# Patient Record
Sex: MALE | Race: WHITE | Employment: UNEMPLOYED
[De-identification: names, ages, dates, MRNs, and addresses within clinical notes are randomized per-mention and may not be internally consistent; named-entity substitution may affect disease eponyms.]

---

## 2017-02-13 ENCOUNTER — OFFICE VISIT (OUTPATIENT)
Dept: FAMILY MEDICINE CLINIC | Facility: CLINIC | Age: 32
End: 2017-02-13

## 2017-02-13 VITALS
WEIGHT: 188.6 LBS | SYSTOLIC BLOOD PRESSURE: 124 MMHG | OXYGEN SATURATION: 98 % | TEMPERATURE: 98 F | HEIGHT: 73 IN | HEART RATE: 85 BPM | DIASTOLIC BLOOD PRESSURE: 80 MMHG | BODY MASS INDEX: 25 KG/M2

## 2017-02-13 DIAGNOSIS — F32.A ANXIETY AND DEPRESSION: ICD-10-CM

## 2017-02-13 DIAGNOSIS — M54.9 BACK PAIN, UNSPECIFIED BACK LOCATION, UNSPECIFIED BACK PAIN LATERALITY, UNSPECIFIED CHRONICITY: ICD-10-CM

## 2017-02-13 DIAGNOSIS — Z83.3 FAMILY HISTORY OF DIABETES MELLITUS: ICD-10-CM

## 2017-02-13 DIAGNOSIS — F19.10 POLYSUBSTANCE ABUSE (HCC): ICD-10-CM

## 2017-02-13 DIAGNOSIS — F41.9 ANXIETY AND DEPRESSION: ICD-10-CM

## 2017-02-13 DIAGNOSIS — G47.00 INSOMNIA, UNSPECIFIED TYPE: Primary | ICD-10-CM

## 2017-02-13 PROCEDURE — 99214 OFFICE O/P EST MOD 30 MIN: CPT | Performed by: PHYSICIAN ASSISTANT

## 2017-02-13 RX ORDER — MELOXICAM 15 MG/1
15 TABLET ORAL DAILY
Qty: 30 TABLET | Refills: 0 | Status: SHIPPED | OUTPATIENT
Start: 2017-02-13 | End: 2017-04-20 | Stop reason: SDUPTHER

## 2017-02-13 RX ORDER — HYDROXYZINE PAMOATE 25 MG/1
25 CAPSULE ORAL 3 TIMES DAILY PRN
Qty: 30 CAPSULE | Refills: 0 | Status: SHIPPED | OUTPATIENT
Start: 2017-02-13 | End: 2017-04-20 | Stop reason: SDUPTHER

## 2017-02-13 ASSESSMENT — ENCOUNTER SYMPTOMS
NAUSEA: 0
SHORTNESS OF BREATH: 0
BOWEL INCONTINENCE: 0
SINUS PRESSURE: 0
EYE DISCHARGE: 0
COUGH: 0
SORE THROAT: 0
DIARRHEA: 0
RHINORRHEA: 0
BACK PAIN: 1
EYE ITCHING: 0
CHEST TIGHTNESS: 0
VOMITING: 0
ABDOMINAL PAIN: 0

## 2017-02-13 ASSESSMENT — PATIENT HEALTH QUESTIONNAIRE - PHQ9
SUM OF ALL RESPONSES TO PHQ QUESTIONS 1-9: 0
2. FEELING DOWN, DEPRESSED OR HOPELESS: 0
SUM OF ALL RESPONSES TO PHQ9 QUESTIONS 1 & 2: 0
1. LITTLE INTEREST OR PLEASURE IN DOING THINGS: 0

## 2017-02-21 ENCOUNTER — TELEPHONE (OUTPATIENT)
Dept: FAMILY MEDICINE CLINIC | Facility: CLINIC | Age: 32
End: 2017-02-21

## 2017-03-02 ENCOUNTER — INITIAL CONSULT (OUTPATIENT)
Dept: PAIN MANAGEMENT | Facility: CLINIC | Age: 32
End: 2017-03-02

## 2017-03-02 VITALS
RESPIRATION RATE: 16 BRPM | HEART RATE: 84 BPM | DIASTOLIC BLOOD PRESSURE: 81 MMHG | SYSTOLIC BLOOD PRESSURE: 123 MMHG | WEIGHT: 192.8 LBS | OXYGEN SATURATION: 98 % | BODY MASS INDEX: 25.55 KG/M2 | HEIGHT: 73 IN

## 2017-03-02 DIAGNOSIS — G89.29 CHRONIC BILATERAL LOW BACK PAIN WITHOUT SCIATICA: Primary | ICD-10-CM

## 2017-03-02 DIAGNOSIS — M54.50 CHRONIC BILATERAL LOW BACK PAIN WITHOUT SCIATICA: Primary | ICD-10-CM

## 2017-03-02 PROBLEM — M54.42 CHRONIC BILATERAL LOW BACK PAIN WITH LEFT-SIDED SCIATICA: Status: ACTIVE | Noted: 2017-03-02

## 2017-03-02 PROCEDURE — 99243 OFF/OP CNSLTJ NEW/EST LOW 30: CPT | Performed by: ANESTHESIOLOGY

## 2017-03-02 RX ORDER — GABAPENTIN 300 MG/1
300 CAPSULE ORAL 2 TIMES DAILY
Qty: 60 CAPSULE | Refills: 3 | Status: SHIPPED | OUTPATIENT
Start: 2017-03-02 | End: 2017-04-01

## 2017-03-02 RX ORDER — BACLOFEN 10 MG/1
10 TABLET ORAL 2 TIMES DAILY
Qty: 60 TABLET | Refills: 3 | Status: SHIPPED | OUTPATIENT
Start: 2017-03-02 | End: 2017-04-01

## 2017-03-02 RX ORDER — BUSPIRONE HYDROCHLORIDE 10 MG/1
10 TABLET ORAL 4 TIMES DAILY
COMMUNITY

## 2017-03-02 ASSESSMENT — ENCOUNTER SYMPTOMS
BACK PAIN: 1
ALLERGIC/IMMUNOLOGIC NEGATIVE: 1
RESPIRATORY NEGATIVE: 1
GASTROINTESTINAL NEGATIVE: 1
SINUS PRESSURE: 1
EYES NEGATIVE: 1

## 2017-04-21 RX ORDER — MELOXICAM 15 MG/1
TABLET ORAL
Qty: 30 TABLET | Refills: 0 | Status: SHIPPED | OUTPATIENT
Start: 2017-04-21

## 2017-04-21 RX ORDER — HYDROXYZINE PAMOATE 25 MG/1
CAPSULE ORAL
Qty: 30 CAPSULE | Refills: 0 | Status: SHIPPED | OUTPATIENT
Start: 2017-04-21

## 2022-08-04 ENCOUNTER — HOSPITAL ENCOUNTER (EMERGENCY)
Age: 37
Discharge: HOME | End: 2022-08-04
Attending: EMERGENCY MEDICINE
Payer: MEDICARE

## 2022-08-04 ENCOUNTER — APPOINTMENT (OUTPATIENT)
Dept: GENERAL RADIOLOGY | Age: 37
End: 2022-08-04
Payer: MEDICARE

## 2022-08-04 VITALS
RESPIRATION RATE: 17 BRPM | WEIGHT: 178 LBS | OXYGEN SATURATION: 96 % | HEIGHT: 73 IN | HEART RATE: 79 BPM | SYSTOLIC BLOOD PRESSURE: 111 MMHG | DIASTOLIC BLOOD PRESSURE: 62 MMHG | TEMPERATURE: 98.5 F | BODY MASS INDEX: 23.59 KG/M2

## 2022-08-04 DIAGNOSIS — R07.89 ATYPICAL CHEST PAIN: Primary | ICD-10-CM

## 2022-08-04 LAB
ABSOLUTE EOS #: 0.3 K/UL (ref 0–0.4)
ABSOLUTE LYMPH #: 2.8 K/UL (ref 1–4.8)
ABSOLUTE MONO #: 1 K/UL (ref 0.1–1.2)
ANION GAP SERPL CALCULATED.3IONS-SCNC: 9 MMOL/L (ref 9–17)
BASOPHILS # BLD: 1 % (ref 0–2)
BASOPHILS ABSOLUTE: 0.1 K/UL (ref 0–0.2)
BUN BLDV-MCNC: 13 MG/DL (ref 6–20)
CALCIUM SERPL-MCNC: 9.7 MG/DL (ref 8.6–10.4)
CHLORIDE BLD-SCNC: 100 MMOL/L (ref 98–107)
CO2: 30 MMOL/L (ref 20–31)
CREAT SERPL-MCNC: 0.74 MG/DL (ref 0.7–1.2)
EOSINOPHILS RELATIVE PERCENT: 3 % (ref 1–4)
GFR AFRICAN AMERICAN: >60 ML/MIN
GFR NON-AFRICAN AMERICAN: >60 ML/MIN
GFR SERPL CREATININE-BSD FRML MDRD: NORMAL ML/MIN/{1.73_M2}
GLUCOSE BLD-MCNC: 92 MG/DL (ref 70–99)
HCT VFR BLD CALC: 42.6 % (ref 41–53)
HEMOGLOBIN: 14.7 G/DL (ref 13.5–17.5)
LYMPHOCYTES # BLD: 25 % (ref 24–44)
MCH RBC QN AUTO: 30.9 PG (ref 26–34)
MCHC RBC AUTO-ENTMCNC: 34.6 G/DL (ref 31–37)
MCV RBC AUTO: 89.3 FL (ref 80–100)
MONOCYTES # BLD: 9 % (ref 2–11)
PDW BLD-RTO: 13.9 % (ref 12.5–15.4)
PLATELET # BLD: 230 K/UL (ref 140–450)
PMV BLD AUTO: 8.8 FL (ref 6–12)
POTASSIUM SERPL-SCNC: 4.1 MMOL/L (ref 3.7–5.3)
RBC # BLD: 4.76 M/UL (ref 4.5–5.9)
SARS-COV-2, RAPID: NOT DETECTED
SEG NEUTROPHILS: 62 % (ref 36–66)
SEGMENTED NEUTROPHILS ABSOLUTE COUNT: 7.1 K/UL (ref 1.8–7.7)
SODIUM BLD-SCNC: 139 MMOL/L (ref 135–144)
SPECIMEN DESCRIPTION: NORMAL
TROPONIN, HIGH SENSITIVITY: 10 NG/L (ref 0–22)
WBC # BLD: 11.5 K/UL (ref 3.5–11)

## 2022-08-04 PROCEDURE — 80048 BASIC METABOLIC PNL TOTAL CA: CPT

## 2022-08-04 PROCEDURE — 84484 ASSAY OF TROPONIN QUANT: CPT

## 2022-08-04 PROCEDURE — 99285 EMERGENCY DEPT VISIT HI MDM: CPT

## 2022-08-04 PROCEDURE — 87635 SARS-COV-2 COVID-19 AMP PRB: CPT

## 2022-08-04 PROCEDURE — 93005 ELECTROCARDIOGRAM TRACING: CPT | Performed by: PHYSICIAN ASSISTANT

## 2022-08-04 PROCEDURE — 6370000000 HC RX 637 (ALT 250 FOR IP): Performed by: PHYSICIAN ASSISTANT

## 2022-08-04 PROCEDURE — 85025 COMPLETE CBC W/AUTO DIFF WBC: CPT

## 2022-08-04 PROCEDURE — 71045 X-RAY EXAM CHEST 1 VIEW: CPT

## 2022-08-04 RX ORDER — ASPIRIN 81 MG/1
324 TABLET, CHEWABLE ORAL ONCE
Status: COMPLETED | OUTPATIENT
Start: 2022-08-04 | End: 2022-08-04

## 2022-08-04 RX ADMIN — ASPIRIN 81 MG CHEWABLE TABLET 324 MG: 81 TABLET CHEWABLE at 12:56

## 2022-08-04 ASSESSMENT — PAIN DESCRIPTION - LOCATION: LOCATION: CHEST

## 2022-08-04 ASSESSMENT — PAIN DESCRIPTION - ONSET: ONSET: SUDDEN

## 2022-08-04 ASSESSMENT — PAIN DESCRIPTION - DESCRIPTORS: DESCRIPTORS: PRESSURE;POUNDING

## 2022-08-04 ASSESSMENT — PAIN SCALES - GENERAL: PAINLEVEL_OUTOF10: 7

## 2022-08-04 ASSESSMENT — PAIN - FUNCTIONAL ASSESSMENT
PAIN_FUNCTIONAL_ASSESSMENT: ACTIVITIES ARE NOT PREVENTED
PAIN_FUNCTIONAL_ASSESSMENT: 0-10

## 2022-08-04 ASSESSMENT — PAIN DESCRIPTION - ORIENTATION: ORIENTATION: MID;RIGHT

## 2022-08-04 ASSESSMENT — PAIN DESCRIPTION - PAIN TYPE: TYPE: ACUTE PAIN

## 2022-08-04 ASSESSMENT — PAIN DESCRIPTION - FREQUENCY: FREQUENCY: CONTINUOUS

## 2022-08-04 NOTE — ED PROVIDER NOTES
[id number] [facility] ED  [address] [facility] [address]  Phone: [phone]  Fax: [phone]        Pt Name: Braden Puckett  MRN: [id number]  Radhagfdon 1985  Date of evaluation: 8/4/22    [address] [place] [address]       Chief Complaint   Patient presents with    Chest Pain     Patient's chest pain started last night after playing with his kids. Patient feels like he has been punched in his chest.  Patient has increased pain with inspiration. HISTORY OF PRESENT ILLNESS (Location/Symptom, Timing/Onset, Context/Setting, Quality, Duration, Modifying Factors, Severity)      Braden Puckett is a 39 y.o. male who presents to the ED via private auto with chest pain. Patient reports that last night he began experiencing a \"tenseness\" in his midsternal chest off to the right side and notes that he was wrestling with his kids he thought maybe he pulled something. He reports that this pain has persisted today as well as some intermittent sharp pains to the lateral aspect of his right chest.  Does feel a little short of breath and that he feels like he cannot take a deep breath and it is painful to do so. Denies history of blood clots, clotting disorders, or malignancy. Denies recent trauma, surgery, or extended travel. Denies hemoptysis, calf pain, or leg swelling. No use of hormones. Patient does have a history of of daily inhaled crack use in the past but has been sober for 2 months and is currently in rehab around many other people. He does report of nasal congestion and cough that started 2 to 3 days ago. Also had some ear pain but this resolved. He has not vaccinated for COVID. Denies taking any medication for symptoms. Denies noticing any other exacerbating or alleviating factors. Denies any exertional changes to the pain. Does mention some occasional intermittent lightheadedness, but nothing today. No syncope.   Cardiac history in grandparents, but no deaths at young ages due to cardiac cause.  Denies any fever, chills, myalgias, sore throat, flank pain, urinary symptoms, nausea, vomiting, diarrhea, or any other concerns at this time. To add, patient went to an urgent care prior to arrival and there was an abnormality on the EKG so they sent him to the ER for further evaluation. PAST MEDICAL / SURGICAL / SOCIAL / FAMILY HISTORY     PMH:  has a past medical history of Cluster headaches and Substance abuse ([address] Surgical History:  has a past surgical history that includes Hand surgery. Social History:  reports that he has been smoking cigarettes. He has been smoking an average of 1 pack per day. He has never used smokeless tobacco. He reports that he does not currently use drugs after having used the following drugs: Methamphetamines (Crystal Meth). He reports that he does not drink alcohol. Family History: He indicated that his mother is alive. He indicated that his father is alive. family history includes Diabetes in his father; High Cholesterol in his father; Other in his father and mother. Psychiatric History: None    Allergies: Darvocet a500 [propoxyphene n-acetaminophen]    Home Medications:   Prior to Admission medications    Medication Sig Start Date End Date Taking? Authorizing Provider   hydrOXYzine (VISTARIL) 25 MG capsule take 1 capsule by mouth three times a day if needed for itching  Patient not taking: Reported on 8/4/2022 4/21/17   Jasper Leach PA-C   meloxicam (MOBIC) 15 MG tablet take 1 take by mouth once daily  Patient not taking: Reported on 8/4/2022 4/21/17   Karin Cosme PA-C   busPIRone (BUSPAR) 10 MG tablet Take 10 mg by mouth 4 times daily  Patient not taking: Reported on 8/4/2022    Historical Provider, MD   gabapentin (NEURONTIN) 300 MG capsule Take 1 capsule by mouth 2 times daily 3/2/17 4/1/17  Alina Gutierrez MD       REVIEW OF SYSTEMS  (2-9 systems for level 4, 10 ormore for level 5)      Review of Systems    Constitutional: See HPI.    Eyes: Denies vision changes. HENT: Denies sore throat or neck pain. Respiratory: See HPI. Cardiovascular: See HPI. GI: Denies vomiting or diarrhea. : Denies painful urination. Musculoskeletal: Denies recent trauma. Skin: Denies new rashes or wounds. Neurologic:  Denies new numbness or weakness. Psychiatric: Denies anxiety. All other systems negative except as marked. PHYSICAL EXAM  (up to 7 for level 4, 8 or more for level 5)      INITIAL VITALS:  height is 6' 1\" (1.854 m) and weight is 80.7 kg (178 lb). His oral temperature is 98.5 °F (36.9 °C). His blood pressure is 111/62 and his pulse is 79. His respiration is 17 and oxygen saturation is 96%. Vital signs reviewed. Physical Exam    General:  Alert, cooperative, well-groomed, well-nourished, appears stated age, and is in no acute distress. Head:  Normocephalic, atraumatic, and without obvious abnormality. Eyes:  Sclerae/conjunctivae clear without injection, pallor, or icterus. Corneas clear without opacities. EOM's intact. ENT: Ears and nose are all without obvious masses lesion or deformity. [place] are in proper alignment without lesions, deformities, masses, erythema, or tenderness. No tragal tenderness. Canals are clear bilaterally without swelling, erythema, or discharge, with a small amount of wet cerumen bilaterally. The bilateral TM's are intact, clear, and translucent without scarring. The light reflex and bony landmarks are present without erythema, bulging or retraction. Hearing grossly intact. Mild swollen erythematous turbinates. Lips and buccal mucosa are pink and moist without lesions. Good dentition. Gingivae is pink and without lesions. Tongue and uvula midline. Symmetric elevation of soft palate upon phonation. No hoarseness or muffled voice. Oropharynx is clear, without erythema. Tonsils are symmetrical, without enlargement or erythema, bilaterally. No exudates or drainage. Neck: Supple and symmetrical. Trachea midline. No adenopathy. No jugular venous distention. Lungs:   No respiratory distress. CTA bilaterally. No wheezes, rhonchi, or rales. Mild right-sided chest wall tenderness to palpation. Heart:  Regular rate. Regular rhythm. No murmurs, rubs, or gallops. Abdomen:   Normoactive bowel sounds. Soft, nontender, nondistended without guarding or rebound. No palpable masses. No CVA tenderness. Extremities: Warm and dry without erythema or edema. No venous stasis changes. Distal pulses 2+. Skin: Soft, good turgor, and well-hydrated. No obvious rashes or lesions. Neurologic: GCS is 15 and no focal deficits are appreciated. Normal gait. Grossly normal motor and sensation. Speech clear. Psychiatric: Normal mood and affect. Normal behavior. Coherent thought process. DIFFERENTIAL DIAGNOSIS / MDM     Patient is a 39 y.o. male who presents to the ED today with the complaints as described above. Vital signs are unremarkable and physical exam demonstrates a well-appearing nontoxic male in no acute distress. Lungs are clear to auscultation. Heart rate and rhythm are regular. Abdomen is soft and nontender. No peripheral edema. Pulses are 2+ and symmetrical.  He is PERC negative. Plan to obtain EKG, CXR, and labwork and reassess.     The patient's CAD and PE risk factors and PERC criteria are as follows:    Risk Stratification for Acute Coronary Syndrome   Family history of coronary artery disease - Yes  History of diabetes - No  History of hypertension - No  History of hyperlipidemia  - No  History of smoking - Yes  Cocaine use - Yes  Known coronary artery disease - No    Risk Stratification for Thoracic Aortic Dissection (TAD)  Family history of TAD - No  History of connective tissue disorder (i.e. Marfans Syndrome, Manfred-Danlos Syndrome) - No  Aguilars Syndrome - No  History of hypertension - No  History of aortic valve disease - No  Pregnancy - No    HEART Risk Score:      Criteria Score Patient #   History High Suspicion 2 0    Mod Suspicion 1     Slightly Suspicion 0    EKG Significant ST Dep. 2 1    Nonspecific 1     Normal 0    Age ? 71 y/o 2 39 y.o.  0    > 40 y/o & < 71 y/o 1     ? 40 y/o 0    Risk Factors* ? 3 2 2    1-2 1     0 0    Troponin ? 3x NL 2 0    > 1 & < 3x NL 1     Normal 0    Score 0-3 Low 3    4-6 Mod     ?7 High    * Risk Factors include: Diabetes, Tobacco use, Hypertension, Hyperlipidemia, Obesity, Atherosclerotic Disease (Prior MI, PCI/CABG, CVA/TIA, PAD), Family History of CAD (before age 72)     [facility]  Criteria Score Response Patient #   Clinical Signs & Sxs of DVT 3 No 0   PE is Likely 1° Diagnosis 3 No 0   Heart Rate ? 100 1.5 No 0   Immobilization ? 3 days, or surgery ? 4 weeks 1.5 No 0   Previous objective dx of PE/DVT 1.5 No 0   Hemoptysis 1 No 0   Malignancy + treatment in past 6 months or palliative 1 No 0   Low Risk 0-1 1.3% chance of PE 0   Moderate Risk 1-6 16.2% chance of PE    High Risk >7 37.5% chance of PE      PERC Criteria   Criteria Response   Age ? 50 No   Heart Rate ? 100 No   Pulse Oximetry ? 95 No   Previous history of DVT/PE No   Trauma/surgery ? 4 weeks No   Hemoptysis No   Exogenous estrogen use No   Unilateral leg swelling No   * Pulmonary embolism may be ruled out with a pretest probability of <15% and completely negative PERC Criteria. The likelihood of pulmonary embolism in this population is 1.8%.     PLAN (LABS / IMAGING / EKG):  Orders Placed This Encounter   Procedures    COVID-19, Rapid    XR CHEST PORTABLE    CBC with Auto Differential    Basic Metabolic Panel w/ Reflex to MG    Troponin    EKG 12 Lead       MEDICATIONS ORDERED:  Orders Placed This Encounter   Medications    aspirin chewable tablet 324 mg       Controlled Substances Monitoring:     DIAGNOSTIC RESULTS     EKG: All EKG's are interpreted by the Emergency Department Physician who either signs or Co-signs this chart in the [address] a cardiologist.    EKG mmol/L    CO2 30 20 - 31 mmol/L    Anion Gap 9 9 - 17 mmol/L    GFR Non-African American >60 >60 mL/min    GFR African American >60 >60 mL/min    GFR Comment         Troponin   Result Value Ref Range    Troponin, High Sensitivity 10 0 - 22 ng/L       EMERGENCY DEPARTMENT COURSE     ED Course as of 08/04/22 1658   Thu Aug 04, 2022   1242 CBC demonstrates minimal bump in WBC at 11.5 but otherwise unremarkable. [MG]   1314 BMP is unremarkable. Troponin is within normal limits. COVID is negative. Chest x-ray is unremarkable. [MG]      ED Course User Index  [MG] Ana María Wagoner PA-C        Vitals:    Vitals:    08/04/22 1222 08/04/22 1225 08/04/22 1250   BP: 129/84  111/62   Pulse: 84 84 79   Resp: 14  17   Temp: 98.5 °F (36.9 °C)     TempSrc: Oral     SpO2: 97%  96%   Weight: 80.7 kg (178 lb)     Height: 6' 1\" (1.854 m)       -------------------------  BP: 111/62, Temp: 98.5 °F (36.9 °C), Heart Rate: 79, Resp: 17      RE-EVALUATION:  See ED Course notes above. The patient's symptomatology and physical exam are not completely consistent with esophageal rupture, myocarditis, ACS, PE, pneumothorax, cardiac tomponade, or aortic pathology. Patient is PERC negative. The patient's EKG demonstrates normal sinus without acute ischemia/infarction noted. The patient's chest x-ray was within normal limits making significant pneumothorax, pneumonia, lung abscess, pleurisy, or aortic pathology much less likely. Patient's lab work including troponin was unremarkable. Patient has been updated regarding these results and was sleeping on arrival. Possibly msk. Would like a note to rest for tomorrow. The patient and/or family and I have discussed the diagnosis and risks, and we agree with discharging home to follow-up with their primary doctor.  The patient appears stable for discharge and has been instructed to return immediately for new concerning symptoms, if the symptoms worsen in any way, or in 8-12 hours if not improved for re-evaluation. We have discussed the symptoms which are most concerning (e.g., fever, changing or worsening pain, persistent vomiting, bloody stools, chest pain, shortness of breath, numbness or weakness to the arms or legs, coolness or color change to the arms or legs) that necessitate immediate return. The patient understands that at this time there is no evidence for a more malignant underlying process, but the patient also understands that early in the process of an illness or injury, an emergency department workup can be falsely reassuring. Routine discharge counseling was given, and the patient understands that worsening, changing or persistent symptoms should prompt an immediate call or follow up with their primary physician or return to the emergency department. The importance of appropriate follow up was also discussed. I have reviewed the disposition diagnosis with the patient and or their family/guardian. I have answered their questions and given discharge instructions. They voiced understanding of these instructions and did not have any further questions or complaints. This patient was seen by the attending physician and they agreed with the assessment and plan. CONSULTS:  None    PROCEDURES:  None    FINAL IMPRESSION      1. Atypical chest pain          DISPOSITION / PLAN     CONDITION ON DISPOSITION:   Good / Stable for discharge.      PATIENT REFERRED TO:  Kodi Rausch PA-C  [address] [place] [address]  [phone]    Call in 1 day      DISCHARGE MEDICATIONS:  Discharge Medication List as of 8/4/2022  1:34 PM          Mae Hargrove   Emergency Medicine Physician Assistant    (Please note that portions of this note were completed with a voice recognition program.  Efforts were made to edit the dictations but occasionally words aremis-transcribed.)        aBrrera Terry PA-C  08/04/22 [id number]

## 2022-08-04 NOTE — ED PROVIDER NOTES
Emergency Department           COMPLAINT       Chief Complaint   Patient presents with    Chest Pain     Patient's chest pain started last night after playing with his kids. Patient feels like he has been punched in his chest.  Patient has increased pain with inspiration. PHYSICAL EXAM      ED Triage Vitals [08/04/22 1222]   BP Temp Temp Source Heart Rate Resp SpO2 Height Weight   129/84 98.5 °F (36.9 °C) Oral 84 14 97 % 6' 1\" (1.854 m) 178 lb (80.7 kg)       Constitutional: Alert, oriented x3, nontoxic, answering questions appropriately, acting properly for age, in no acute distress   HEENT: Extraocular muscles intact,   Neck: Trachea midline   Cardiovascular: Regular rhythm and rate no murmurs   Respiratory: Clear to auscultation bilaterally no wheezes, rhonchi, rales, no respiratory distress no tachypnea no retractions no hypoxia  Gastrointestinal: Soft, nontender, nondistended, positive bowel sounds. No rebound, rigidity, or guarding. Musculoskeletal: No extremity pain or swelling no calf tenderness or asymmetry  Neurologic: Moving all 4 extremities without difficulty there are no gross focal neurologic deficits   Skin: Warm and dry     Physical Exam  DIAGNOSTIC RESULTS     EKG: All EKG's are interpreted by the Emergency Department Physician who either signs or Co-signs this chart in the absence of a cardiologist.    1226 sinus rhythm rate 83  QRs 98  no acute ST or T wave changes. Not indicated unless otherwise documented above or in the midlevel documentation    LABS:  Results for orders placed or performed during the hospital encounter of 08/04/22   COVID-19, Rapid    Specimen: Nasopharyngeal Swab   Result Value Ref Range    Specimen Description . NASOPHARYNGEAL SWAB     SARS-CoV-2, Rapid Not Detected Not Detected   CBC with Auto Differential   Result Value Ref Range    WBC 11.5 (H) 3.5 - 11.0 k/uL    RBC 4.76 4.5 - 5.9 m/uL    Hemoglobin 14.7 13.5 - 17.5 g/dL    Hematocrit cell count of 11.5. PERC negative. Low suspicion for cardiac etiology. Heart score of 0. Previous history of cocaine abuse 2 months clean. Suspect musculoskeletal in origin. Will discharge to follow-up and return if worsening symptoms or any other concerns. Faculty Attestation    I performed a history and physical examination of the patient and discussed management with the mid level provideer. I reviewed the mid level provider's note and agree with the documented findings and plan of care. Any areas of disagreement are noted on the chart. I was personally present for the key portions of any procedures. I have documented in the chart those procedures where I was not present during the key portions. I have reviewed the emergency nurses triage note. I agree with the chief complaint, past medical history, past surgical history, allergies, medications, social and family history as documented unless otherwise noted below. Documentation of the HPI, Physical Exam and Medical Decision Making performed by medical students or scribes is based on my personal performance of the HPI, PE and MDM. For Physician Assistant/ Nurse Practitioner cases/documentation I have personally evaluated this patient and have completed at least one if not all key elements of the E/M (history, physical exam, and MDM). Additional findings are as noted.      Gilberto Gonzalez, DO  08/04/22 [id number]

## 2022-08-04 NOTE — Clinical Note
Roman Awad was seen and treated in our emergency department on 8/4/2022. He may return to work on 08/06/2022. Recommended to rest in room/no heavy lifting tomorrow and for the weekend and recheck on Monday by healthcare provider. If you have any questions or concerns, please don't hesitate to call.       Kiran Miramontes PA-C

## 2022-08-04 NOTE — DISCHARGE INSTRUCTIONS
Take ibuprofen and acetaminophen as prescribed and on a regular schedule for the next few days. You can alternate these 2 medications every 3 hours or do them together every 6 hours to help control your pain. Otherwise, utilize rest, ice for 20 minutes several times a day. PLEASE RETURN TO THE EMERGENCY DEPARTMENT IMMEDIATELY if your symptoms worsen in anyway or in 1-2 days if not improved for re-evaluation. You should immediately return to the ER for symptoms such as new or worsening pain, fever, numbness or weakness to the arms or legs, coolness or color change of the arms or legs. Reinaldo Last!!!    From [facility] ([facility]) and [facility] Emergency Services    On behalf of the Emergency Department staff at [facility]), I would like to thank you for giving us the opportunity to address your health care needs and concerns. We hope that during your visit, our service was delivered in a professional and caring manner. Please keep [facility]) in mind as we walk with you down the path to your own personal wellness. Please expect an automated text message or email from us so we can ask a few questions about your health and progress. Based on your answers, a clinician may call you back to offer help and instructions. Please understand that early in the process of an illness or injury, an emergency department workup can be falsely reassuring. If you notice any worsening, changing or persistent symptoms please call your family doctor or return to the ER immediately. Tell us how we did during your visit at [email or web address]   and let us know about your experience

## 2022-08-05 LAB
EKG ATRIAL RATE: 83 BPM
EKG P AXIS: 62 DEGREES
EKG P-R INTERVAL: 134 MS
EKG Q-T INTERVAL: 350 MS
EKG QRS DURATION: 98 MS
EKG QTC CALCULATION (BAZETT): 411 MS
EKG R AXIS: 61 DEGREES
EKG T AXIS: 60 DEGREES
EKG VENTRICULAR RATE: 83 BPM

## 2022-08-08 ENCOUNTER — TELEPHONE (OUTPATIENT)
Dept: FAMILY MEDICINE CLINIC | Age: 37
End: 2022-08-08

## 2022-08-08 NOTE — TELEPHONE ENCOUNTER
Number  no longer valid  Has not been seen since 2017    [facility] ([facility]) ED Follow up Call    Reason for ED visit:  chest pain from note         Yaz Murbecca , this is  from  office, just calling to see how you are doing after your recent ED visit. Did you receive discharge instructions? Do you understand the discharge instructions? Did the ED give you any new prescriptions? Were you able to fill your prescriptions? Do you have one of our red, yellow and green  Zone sheets that help you to determine when you should go to the ED? Do you need or want to make a follow up appt with your PCP? Do you have any further needs in the home i.e. Equipment? FU appts/Provider:    No future appointments.     VOICEMAIL DOCUMENTATION - ERASE IF NOT USED